# Patient Record
Sex: MALE | Race: BLACK OR AFRICAN AMERICAN | Employment: UNEMPLOYED | URBAN - METROPOLITAN AREA
[De-identification: names, ages, dates, MRNs, and addresses within clinical notes are randomized per-mention and may not be internally consistent; named-entity substitution may affect disease eponyms.]

---
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
## 2024-11-11 ENCOUNTER — HOSPITAL ENCOUNTER (INPATIENT)
Facility: HOSPITAL | Age: 19
LOS: 1 days | Discharge: OTHER INSTITUTION | DRG: 881 | End: 2024-11-13
Attending: STUDENT IN AN ORGANIZED HEALTH CARE EDUCATION/TRAINING PROGRAM | Admitting: PSYCHIATRY & NEUROLOGY
Payer: COMMERCIAL

## 2024-11-11 DIAGNOSIS — F48.9 MENTAL HEALTH PROBLEM: Primary | ICD-10-CM

## 2024-11-11 LAB
ALBUMIN SERPL-MCNC: 4.5 G/DL (ref 3.5–5)
ALBUMIN/GLOB SERPL: 1 (ref 1.1–2.2)
ALP SERPL-CCNC: 84 U/L (ref 45–117)
ALT SERPL-CCNC: 13 U/L (ref 12–78)
AMPHET UR QL SCN: NEGATIVE
ANION GAP SERPL CALC-SCNC: 12 MMOL/L (ref 2–12)
AST SERPL-CCNC: 13 U/L (ref 15–37)
BARBITURATES UR QL SCN: NEGATIVE
BASOPHILS # BLD: 0 K/UL (ref 0–0.1)
BASOPHILS NFR BLD: 0 % (ref 0–1)
BENZODIAZ UR QL: NEGATIVE
BILIRUB SERPL-MCNC: 0.5 MG/DL (ref 0.2–1)
BUN SERPL-MCNC: 12 MG/DL (ref 6–20)
BUN/CREAT SERPL: 11 (ref 12–20)
CALCIUM SERPL-MCNC: 10 MG/DL (ref 8.5–10.1)
CANNABINOIDS UR QL SCN: NEGATIVE
CHLORIDE SERPL-SCNC: 102 MMOL/L (ref 97–108)
CO2 SERPL-SCNC: 27 MMOL/L (ref 21–32)
COCAINE UR QL SCN: NEGATIVE
CREAT SERPL-MCNC: 1.13 MG/DL (ref 0.7–1.3)
DIFFERENTIAL METHOD BLD: NORMAL
EOSINOPHIL # BLD: 0 K/UL (ref 0–0.4)
EOSINOPHIL NFR BLD: 0 % (ref 0–7)
ERYTHROCYTE [DISTWIDTH] IN BLOOD BY AUTOMATED COUNT: 12 % (ref 11.5–14.5)
ETHANOL SERPL-MCNC: 30 MG/DL (ref 0–0.08)
GLOBULIN SER CALC-MCNC: 4.4 G/DL (ref 2–4)
GLUCOSE SERPL-MCNC: 103 MG/DL (ref 65–100)
HCT VFR BLD AUTO: 45.2 % (ref 36.6–50.3)
HGB BLD-MCNC: 15.4 G/DL (ref 12.1–17)
IMM GRANULOCYTES # BLD AUTO: 0 K/UL (ref 0–0.04)
IMM GRANULOCYTES NFR BLD AUTO: 0 % (ref 0–0.5)
LYMPHOCYTES # BLD: 1.8 K/UL (ref 0.8–3.5)
LYMPHOCYTES NFR BLD: 25 % (ref 12–49)
Lab: NORMAL
MCH RBC QN AUTO: 28.5 PG (ref 26–34)
MCHC RBC AUTO-ENTMCNC: 34.1 G/DL (ref 30–36.5)
MCV RBC AUTO: 83.5 FL (ref 80–99)
METHADONE UR QL: NEGATIVE
MONOCYTES # BLD: 0.4 K/UL (ref 0–1)
MONOCYTES NFR BLD: 5 % (ref 5–13)
NEUTS SEG # BLD: 4.9 K/UL (ref 1.8–8)
NEUTS SEG NFR BLD: 70 % (ref 32–75)
NRBC # BLD: 0 K/UL (ref 0–0.01)
NRBC BLD-RTO: 0 PER 100 WBC
OPIATES UR QL: NEGATIVE
PCP UR QL: NEGATIVE
PLATELET # BLD AUTO: 346 K/UL (ref 150–400)
PMV BLD AUTO: 9.4 FL (ref 8.9–12.9)
POTASSIUM SERPL-SCNC: 3.7 MMOL/L (ref 3.5–5.1)
PROT SERPL-MCNC: 8.9 G/DL (ref 6.4–8.2)
RBC # BLD AUTO: 5.41 M/UL (ref 4.1–5.7)
SODIUM SERPL-SCNC: 141 MMOL/L (ref 136–145)
WBC # BLD AUTO: 7.2 K/UL (ref 4.1–11.1)

## 2024-11-11 PROCEDURE — 36415 COLL VENOUS BLD VENIPUNCTURE: CPT

## 2024-11-11 PROCEDURE — 85025 COMPLETE CBC W/AUTO DIFF WBC: CPT

## 2024-11-11 PROCEDURE — 82077 ASSAY SPEC XCP UR&BREATH IA: CPT

## 2024-11-11 PROCEDURE — 80053 COMPREHEN METABOLIC PANEL: CPT

## 2024-11-11 PROCEDURE — 80307 DRUG TEST PRSMV CHEM ANLYZR: CPT

## 2024-11-11 PROCEDURE — 99285 EMERGENCY DEPT VISIT HI MDM: CPT

## 2024-11-11 ASSESSMENT — PAIN - FUNCTIONAL ASSESSMENT: PAIN_FUNCTIONAL_ASSESSMENT: NONE - DENIES PAIN

## 2024-11-12 PROBLEM — F43.21 ADJUSTMENT DISORDER WITH DEPRESSED MOOD: Status: ACTIVE | Noted: 2024-11-12

## 2024-11-12 PROBLEM — F60.3 BORDERLINE PERSONALITY DISORDER (HCC): Status: ACTIVE | Noted: 2024-11-12

## 2024-11-12 PROBLEM — F39 UNSPECIFIED MOOD (AFFECTIVE) DISORDER (HCC): Status: ACTIVE | Noted: 2024-11-12

## 2024-11-12 PROBLEM — F39 UNSPECIFIED MOOD (AFFECTIVE) DISORDER (HCC): Status: RESOLVED | Noted: 2024-11-12 | Resolved: 2024-11-12

## 2024-11-12 PROCEDURE — 99223 1ST HOSP IP/OBS HIGH 75: CPT | Performed by: PSYCHIATRY & NEUROLOGY

## 2024-11-12 PROCEDURE — 1240000000 HC EMOTIONAL WELLNESS R&B

## 2024-11-12 PROCEDURE — 6370000000 HC RX 637 (ALT 250 FOR IP)

## 2024-11-12 RX ORDER — ACETAMINOPHEN 325 MG/1
650 TABLET ORAL EVERY 4 HOURS PRN
Status: DISCONTINUED | OUTPATIENT
Start: 2024-11-12 | End: 2024-11-12 | Stop reason: SDUPTHER

## 2024-11-12 RX ORDER — MAGNESIUM HYDROXIDE/ALUMINUM HYDROXICE/SIMETHICONE 120; 1200; 1200 MG/30ML; MG/30ML; MG/30ML
30 SUSPENSION ORAL EVERY 6 HOURS PRN
Status: DISCONTINUED | OUTPATIENT
Start: 2024-11-12 | End: 2024-11-12

## 2024-11-12 RX ORDER — HALOPERIDOL 5 MG/ML
2.5 INJECTION INTRAMUSCULAR EVERY 4 HOURS PRN
Status: DISCONTINUED | OUTPATIENT
Start: 2024-11-12 | End: 2024-11-12

## 2024-11-12 RX ORDER — ACETAMINOPHEN 325 MG/1
650 TABLET ORAL EVERY 4 HOURS PRN
Status: DISCONTINUED | OUTPATIENT
Start: 2024-11-12 | End: 2024-11-13 | Stop reason: HOSPADM

## 2024-11-12 RX ORDER — DIPHENHYDRAMINE HYDROCHLORIDE 50 MG/ML
25 INJECTION INTRAMUSCULAR; INTRAVENOUS EVERY 4 HOURS PRN
Status: DISCONTINUED | OUTPATIENT
Start: 2024-11-12 | End: 2024-11-12

## 2024-11-12 RX ORDER — HALOPERIDOL 5 MG/1
2.5 TABLET ORAL EVERY 4 HOURS PRN
Status: DISCONTINUED | OUTPATIENT
Start: 2024-11-12 | End: 2024-11-12

## 2024-11-12 RX ORDER — TRAZODONE HYDROCHLORIDE 50 MG/1
50 TABLET, FILM COATED ORAL NIGHTLY PRN
Status: DISCONTINUED | OUTPATIENT
Start: 2024-11-12 | End: 2024-11-13 | Stop reason: HOSPADM

## 2024-11-12 RX ORDER — MAGNESIUM HYDROXIDE/ALUMINUM HYDROXICE/SIMETHICONE 120; 1200; 1200 MG/30ML; MG/30ML; MG/30ML
30 SUSPENSION ORAL EVERY 6 HOURS PRN
Status: DISCONTINUED | OUTPATIENT
Start: 2024-11-12 | End: 2024-11-13 | Stop reason: HOSPADM

## 2024-11-12 RX ORDER — HYDROXYZINE HYDROCHLORIDE 25 MG/1
50 TABLET, FILM COATED ORAL 3 TIMES DAILY PRN
Status: DISCONTINUED | OUTPATIENT
Start: 2024-11-12 | End: 2024-11-13 | Stop reason: HOSPADM

## 2024-11-12 RX ORDER — POLYETHYLENE GLYCOL 3350 17 G/17G
17 POWDER, FOR SOLUTION ORAL DAILY PRN
Status: DISCONTINUED | OUTPATIENT
Start: 2024-11-12 | End: 2024-11-12

## 2024-11-12 RX ORDER — HALOPERIDOL 5 MG/1
5 TABLET ORAL EVERY 4 HOURS PRN
Status: DISCONTINUED | OUTPATIENT
Start: 2024-11-12 | End: 2024-11-13 | Stop reason: HOSPADM

## 2024-11-12 RX ORDER — HALOPERIDOL 5 MG/ML
5 INJECTION INTRAMUSCULAR EVERY 4 HOURS PRN
Status: DISCONTINUED | OUTPATIENT
Start: 2024-11-12 | End: 2024-11-13 | Stop reason: HOSPADM

## 2024-11-12 RX ORDER — DIPHENHYDRAMINE HYDROCHLORIDE 50 MG/ML
50 INJECTION INTRAMUSCULAR; INTRAVENOUS EVERY 4 HOURS PRN
Status: DISCONTINUED | OUTPATIENT
Start: 2024-11-12 | End: 2024-11-13 | Stop reason: HOSPADM

## 2024-11-12 RX ORDER — POLYETHYLENE GLYCOL 3350 17 G/17G
17 POWDER, FOR SOLUTION ORAL DAILY PRN
Status: DISCONTINUED | OUTPATIENT
Start: 2024-11-12 | End: 2024-11-13 | Stop reason: HOSPADM

## 2024-11-12 RX ADMIN — HYDROXYZINE HYDROCHLORIDE 50 MG: 25 TABLET, FILM COATED ORAL at 03:17

## 2024-11-12 RX ADMIN — TRAZODONE HYDROCHLORIDE 50 MG: 50 TABLET ORAL at 03:18

## 2024-11-12 ASSESSMENT — LIFESTYLE VARIABLES
HOW OFTEN DO YOU HAVE A DRINK CONTAINING ALCOHOL: MONTHLY OR LESS
HOW MANY STANDARD DRINKS CONTAINING ALCOHOL DO YOU HAVE ON A TYPICAL DAY: 1 OR 2

## 2024-11-12 ASSESSMENT — SLEEP AND FATIGUE QUESTIONNAIRES
DO YOU USE A SLEEP AID: NO
AVERAGE NUMBER OF SLEEP HOURS: 6
DO YOU USE A SLEEP AID: NO
DO YOU HAVE DIFFICULTY SLEEPING: NO
DO YOU HAVE DIFFICULTY SLEEPING: NO

## 2024-11-12 ASSESSMENT — PAIN SCALES - GENERAL
PAINLEVEL_OUTOF10: 0

## 2024-11-12 NOTE — CARE COORDINATION
11/12/24 0901   ITP   Date of Plan 11/12/24   Primary Diagnosis Code unspecified mood disorder   Barriers to Treatment Psychiatric symptom (comment)  (depression)   Strengths Incorporated in Plan Support network;Family supports;Acknowledging need for assistance   Plan of Care   Long Term Goal (LTG) Stated in patient/guardian terms remain safe and stable in the community   Short Term Goal 1   Short Term Goal 1 Client will maintain compliance with medication regime   Baseline Functioning client has not been taking medication as prescribed and is experiencing symptoms of depression and suicidal ideation   Target client will take medication as prescribed and report a reduction in symptoms of depression and suicidal ideation   Objectives Client will participate in group therapy;Other (comment)   Intervention 1 Monitor medications   Frequency daily   Measured by Self report;Staff observation   Staff Responsible Clinical staff;[facility] staff   Intervention 2 Referral to community services   Frequency prior to discharge   Measured by Self report;Staff observation   Staff Responsible Clinical staff;[facility] staff   STG Goal 1 Status: Patient Appears to be  Progressing toward treatment plan goal  (acknolwedging need for assistance)   Short Term Goal 2   Short Term Goal 2 Client will learn and demonstrate effective coping skills   Baseline Functioning client is not able to use effective coping skills and requires hospitalization to keep themselves safe   Target client will learn and use effective coping skills to remain safe in the community   Objectives Client will participate in group therapy;Other (comment)   Intervention 1 Group therapy;Milieu therapy and support   Frequency on going   Measured by Self report;Staff observation   Staff Responsible Clinical staff;[facility] staff   Intervention 2 Referral to community services   Frequency prior to discharge   Measured by Staff observation;Self report   Staff Responsible Clinical staff;[facility]

## 2024-11-12 NOTE — GROUP NOTE
Group Therapy Note    Date: 11/12/2024    Group Start Time: 1400  Group End Time: 1500  Group Topic: Recreational    [facility]    Marilee Kaplan        Group Therapy Note    Attendees: 8       Patient's Goal:  To concentrate on selected task    Notes:  Pt did not attend session      Discipline Responsible: Recreational Therapist      Signature:  MARILEE KAPLAN

## 2024-11-12 NOTE — CARE COORDINATION
11/12/24 0900   Suicidal Ideation   Wish to be Dead Lifetime - Yes;Past 1 month - Yes   Non-Specific Active Suicidal Thoughts Lifetime - Yes;Past 1 month - Yes   Suicidal Behavior Trigger Wish to be Dead   Active Suicidal Ideation with Any Methods (Not Plan) without Intent to Act Lifetime - Yes;Past 1 month - Yes   Active Suicidal Ideation with Some Intent to Act, without Specific Plan Lifetime - Yes;Past 1 month - Yes   Active Suicidal Ideation with Specific Plan and Intent Lifetime - Yes;Past 1 month - No   Intensity of Ideation   Lifetime - Most Severe Ideation 4   Lifetime - Most Recent Ideation 2   Frequency Once a week   Duration Less than 1 hour/some of the time   Controllability Can control thoughts with some difficulty   Deterrents Deterrents probably stopped you   Reasons for Ideation Does not apply   Suicidal Behavior   Actual Attempt Lifetime - Yes;Past 3 months - No   Has subject engaged in Non-Suicidal Self-Injurious Behavior? Lifetime - Yes;Past 3 months - Yes   Interrupted Attempt Past 3 months - No;Lifetime - Yes   Aborted or Self-Interrupted Attempt Lifetime - No;Past 3 months - No   Preparatory Acts or Behavior Lifetime - Yes;Past 3 months - No   Actual Lethality/Medical Damage 2   Potential Lethality 2

## 2024-11-12 NOTE — H&P
stopped taking them in April.  Pt described his thoughts of self harm being intermittent at times but pretty frequent. Pt has been able to go to work but stated he doesn't have much motivation do other things. He reported sleeping \"ok\" but a decrease in appetite and some weight loss over the past few months.   Pt denies access to any firearms. Pt denies HI/AHVH. At this time admission is recommended.    During My encounter on 11/12/2024:     Patient was examined at bedside via telehealth block.  Patient denies any medical issues no chest pain or shortness of breath no fever no chills    The patient denies any headache, blurry vision, sore throat, trouble swallowing, trouble with speech, chest pain, SOB, cough, fever, chills, N/V/D, abd pain, urinary symptoms, constipation, recent travels, sick contacts, focal or generalized neurological symptoms, falls, injuries, rashes, contact with COVID-19 diagnosed patients, hematemesis, melena, hemoptysis, hematuria, rashes, denies starting any new medications and denies any other concerns or problems besides as mentioned above.         REVIEW OF SYSTEMS:  Pertinent items are noted in the History of Present Illness.     Past Medical History:   Diagnosis Date    ADHD     age 3    Anxiety     age 11    Depression     Hx of seasonal allergies       No past surgical history on file.  Prior to Admission medications    Not on File     Allergies   Allergen Reactions    Prunus Persica Itching and Swelling      Family History   Problem Relation Age of Onset    No Known Problems Brother     No Known Problems Brother     No Known Problems Sister     Hypertension Father     No Known Problems Mother     No Known Problems Brother       Social History:  reports that he has never smoked. He has never used smokeless tobacco. He reports that he does not currently use alcohol. He reports that he does not currently use drugs.   Social Determinants of Health     Tobacco Use: Low Risk  (6/24/2022) 
0.0  0.0 - 0.1 K/UL Final    Immature Granulocytes Absolute 11/11/2024 0.0  0.00 - 0.04 K/UL Final    Differential Type 11/11/2024 AUTOMATED    Final    Sodium 11/11/2024 141  136 - 145 mmol/L Final    Potassium 11/11/2024 3.7  3.5 - 5.1 mmol/L Final    Chloride 11/11/2024 102  97 - 108 mmol/L Final    CO2 11/11/2024 27  21 - 32 mmol/L Final    Anion Gap 11/11/2024 12  2 - 12 mmol/L Final    Glucose 11/11/2024 103 (H)  65 - 100 mg/dL Final    BUN 11/11/2024 12  6 - 20 MG/DL Final    Creatinine 11/11/2024 1.13  0.70 - 1.30 MG/DL Final    BUN/Creatinine Ratio 11/11/2024 11 (L)  12 - 20   Final    Est, Glom Filt Rate 11/11/2024 >90  >60 ml/min/1.73m2 Final    Calcium 11/11/2024 10.0  8.5 - 10.1 MG/DL Final    Total Bilirubin 11/11/2024 0.5  0.2 - 1.0 MG/DL Final    ALT 11/11/2024 13  12 - 78 U/L Final    AST 11/11/2024 13 (L)  15 - 37 U/L Final    Alk Phosphatase 11/11/2024 84  45 - 117 U/L Final    Total Protein 11/11/2024 8.9 (H)  6.4 - 8.2 g/dL Final    Albumin 11/11/2024 4.5  3.5 - 5.0 g/dL Final    Globulin 11/11/2024 4.4 (H)  2.0 - 4.0 g/dL Final    Albumin/Globulin Ratio 11/11/2024 1.0 (L)  1.1 - 2.2   Final          MEDICATIONS     SCHEDULED MEDICATIONS            ASSESSMENT & PLAN     The patient, Dioni Martinez, is a 19 y.o.  male who presents at this time for treatment of the following diagnoses:  Unspecified mood (affective) disorder (HCC)  ASSESSMENT: the patient presents with suicidal ideation in the setting of psychosocial stressors, some EtOH use. He had been previously diagnosed with borderline personality disorder, with a crisis that appears to be resolving with support. He would benefit from a mood stabilizing medication. Will treat symptomatically.  PLAN:  - Consider mood stabilizer for mood lability related to BPD  - IGM therapy as tolerated  - Expand database / obtain collateral  - Dispo planning (safety planning with family)       I will obtain labwork for all medications for which routine

## 2024-11-12 NOTE — PROGRESS NOTES
[facility] Admission Pharmacy Medication Reconciliation    Information obtained from: Patient interview  RxQuery data available1: No    Comments/recommendations:  Denies taking any prescription or OTC medications currently. Reports history of hydroxyzine and sertraline.     Medication changes (since last review):  Added  None  Removed  Hydroxyzine  Sertraline  Adjusted  None     1RxQuery pharmacy benefit data reflects medications filled and processed through the patient's insurance, however                this data does NOT capture whether the medication was picked up or is currently being taken by the patient.       Patient allergies:   Allergies as of 11/11/2024 - Fully Reviewed 11/11/2024   Allergen Reaction Noted    Prunus persica Itching and Swelling 04/08/2022       Prior to admission medications:   None          Thank you,  Leia Sevilla, PharmD, BCPS, BCPP  Clinical Pharmacy Specialist, Behavioral Health  Desk: [phone] ([phone])  Pharmacy: [phone] ([phone])

## 2024-11-12 NOTE — BSMART NOTE
Comprehensive Assessment Form Part 1      Section I - Disposition    Primary Diagnosis: Depression   Secondary Diagnosis:     The Medical Doctor to Psychiatrist conference was notcompleted.  The Medical Doctor is in agreement with Psychiatrist disposition because of (reason) pt meets criteria for admission.  The plan is voluntary impatient U admission .  The on-call Psychiatrist consulted was  .  The admitting Psychiatrist will be  .  The admitting Diagnosis is Depression .  The Payor source is no active insurance This writer reviewed the Cross Suicide Severity Rating Scale in nursing flowsheet and the risk level assigned is high risk.  Based on this assessment, the risk of suicide is high risk and the plan is voluntary admission .     Section II - Integrated Summary  Summary:  Pt presented to the ED after the pt's family was concerned that he was having thoughts of self harm and they contacted the police. Pt was seen via telehealth ar [facility] ED. Pt was sitting up on the bed. Pt reported that he overdose in April of this year. He stated that he took the medications he was prescribed Seroquel and Hydroxyzine , the OD did not result in an admission or ER visit. Pt's family did call poison control because the pt couldn't move his body. Pt did throw up and he reported the family did not take him to the ER. Pt was seen in 2022 at a family practice for a panic attack and he was prescribed medications/. In April of this year he went to [facility] and was sent to the [facility]. He can't recall the provider he saw but he was prescribed medications but he stopped taking them in April.  Pt described his thoughts of self harm being intermittent at times but pretty frequent. Pt has been able to go to work but stated he doesn't have much motivation do other things. He reported sleeping \"ok\" but a decrease in appetite and some weight loss over the past few months.   Pt denies access to any firearms. Pt denies

## 2024-11-12 NOTE — ED TRIAGE NOTES
Pt reports SI x today, sts his s.o family called the police to check on him because he had thoughts of self harm. Pt reports he's had thoughts before, last time was in April. Pt sts he was diagnosed w BPD and has not been receiving tx. Pt aox4 , reports no active plan. Agreeable to ensuring safety at this time.

## 2024-11-12 NOTE — BSMART NOTE
BSMART assessment completed, and suicide risk level noted to be high . Primary Nurse Mariya and Charge Nurse N/A  and Physician Arcelia notified. Concerns not observed.    Pt has a constant sitter outside the room.

## 2024-11-12 NOTE — ED PROVIDER NOTES
EMERGENCY DEPARTMENT HISTORY AND PHYSICAL EXAM      Date: 11/11/2024  Patient Name: Dioni Martinez    History of Presenting Illness     Chief Complaint   Patient presents with    Suicidal         HPI: History From: patient, History limited by: none  Dioni Martinez, 19 y.o. male presents to the ED with cc of suicidal ideation.  This has been going on for the past 2 days, he reports thoughts of not wanting to be around, however denies specific plan.  He denies homicidal ideation or hallucinations.  Has been undergoing some life stress recently.  States that his girlfriend's mom was concerned about his mental health.  He otherwise feels well, no headaches, fevers, cough or vomiting.  He reports a past medical history of mental health disorder, otherwise denies medical problems.  Reports that he was previously on sertraline and hydroxyzine, however is not currently taking anything.          There are no other complaints, changes, or physical findings at this time.    PCP: Minh Kemp MD    No current facility-administered medications on file prior to encounter.     Current Outpatient Medications on File Prior to Encounter   Medication Sig Dispense Refill    hydrOXYzine HCl (ATARAX) 50 MG tablet Take 1 tablet by mouth every 6 hours as needed      sertraline (ZOLOFT) 100 MG tablet Take 1 tablet by mouth daily         Past History     Past Medical History:  Past Medical History:   Diagnosis Date    ADHD     age 3    Anxiety     age 11    Depression     Hx of seasonal allergies        Past Surgical History:  No past surgical history on file.    Family History:  Family History   Problem Relation Age of Onset    No Known Problems Brother     No Known Problems Brother     No Known Problems Sister     Hypertension Father     No Known Problems Mother     No Known Problems Brother        Social History:  Social History     Tobacco Use    Smoking status: Never    Smokeless tobacco: Never   Substance Use Topics    Alcohol

## 2024-11-13 VITALS
BODY MASS INDEX: 18.03 KG/M2 | OXYGEN SATURATION: 98 % | RESPIRATION RATE: 16 BRPM | SYSTOLIC BLOOD PRESSURE: 118 MMHG | WEIGHT: 108.2 LBS | HEIGHT: 65 IN | HEART RATE: 97 BPM | DIASTOLIC BLOOD PRESSURE: 66 MMHG | TEMPERATURE: 97.7 F

## 2024-11-13 PROCEDURE — 6370000000 HC RX 637 (ALT 250 FOR IP): Performed by: PSYCHIATRY & NEUROLOGY

## 2024-11-13 PROCEDURE — 99239 HOSP IP/OBS DSCHRG MGMT >30: CPT | Performed by: PSYCHIATRY & NEUROLOGY

## 2024-11-13 RX ORDER — TRAZODONE HYDROCHLORIDE 50 MG/1
50 TABLET, FILM COATED ORAL NIGHTLY PRN
Qty: 30 TABLET | Refills: 1 | Status: SHIPPED | OUTPATIENT
Start: 2024-11-13

## 2024-11-13 RX ORDER — LAMOTRIGINE 100 MG/1
TABLET ORAL
Qty: 42 TABLET | Refills: 0 | Status: SHIPPED | OUTPATIENT
Start: 2024-12-13

## 2024-11-13 RX ORDER — LAMOTRIGINE 25 MG/1
TABLET ORAL
Qty: 42 TABLET | Refills: 0 | Status: SHIPPED | OUTPATIENT
Start: 2024-11-13

## 2024-11-13 RX ORDER — LAMOTRIGINE 25 MG/1
25 TABLET ORAL DAILY
Status: DISCONTINUED | OUTPATIENT
Start: 2024-11-13 | End: 2024-11-13 | Stop reason: HOSPADM

## 2024-11-13 RX ORDER — HYDROXYZINE HYDROCHLORIDE 50 MG/1
50 TABLET, FILM COATED ORAL 2 TIMES DAILY PRN
Qty: 60 TABLET | Refills: 1 | Status: SHIPPED | OUTPATIENT
Start: 2024-11-13 | End: 2025-01-12

## 2024-11-13 RX ADMIN — LAMOTRIGINE 25 MG: 25 TABLET ORAL at 12:04

## 2024-11-13 ASSESSMENT — PAIN SCALES - GENERAL: PAINLEVEL_OUTOF10: 0

## 2024-11-13 NOTE — PLAN OF CARE
Problem: Depression  Goal: Will be euthymic at discharge  Description: INTERVENTIONS:  1. Administer medication as ordered  2. Provide emotional support via 1:1 interaction with staff  3. Encourage involvement in milieu/groups/activities  4. Monitor for social isolation  11/12/2024 0358 by Beto Mccormick RN  Outcome: Not Progressing     Problem: Anxiety  Goal: Will report anxiety at manageable levels  Description: INTERVENTIONS:  1. Administer medication as ordered  2. Teach and rehearse alternative coping skills  3. Provide emotional support with 1:1 interaction with staff  11/12/2024 0358 by Beto Mccormick RN  Outcome: Not Progressing     Problem: Self Harm/Suicidality  Goal: Will have no self-injury during hospital stay  Description: INTERVENTIONS:  1.  Ensure constant observer at bedside with Q15M safety checks  2.  Maintain a safe environment  3.  Secure patient belongings  4.  Ensure family/visitors adhere to safety recommendations  5.  Ensure safety tray has been added to patient's diet order  6.  Every shift and PRN: Re-assess suicidal risk via Frequent Screener    11/12/2024 1230 by Mary Jensen RN  Outcome: Progressing  11/12/2024 0358 by Beto Mccormick RN  Outcome: Progressing     Problem: Safety - Adult  Goal: Free from fall injury  11/12/2024 1230 by Mary Jensen RN  Outcome: Progressing  11/12/2024 0358 by Beto Mccormick RN  Outcome: Progressing     Problem: Psychosis  Goal: Will report no hallucinations or delusions  Description: INTERVENTIONS:  1. Administer medication as  ordered  2. Assist with reality testing to support increasing orientation  3. Assess if patient's hallucinations or delusions are encouraging self harm or harm to others and intervene as appropriate  11/12/2024 0358 by Beto Mccormick RN  Outcome: Progressing     Problem: Behavior  Goal: Pt/Family maintain appropriate behavior and adhere to behavioral management agreement, if implemented  Description: INTERVENTIONS:  1. Assess 
  Problem: Discharge Planning  Goal: Discharge to home or other facility with appropriate resources  11/13/2024 1503 by Jennie Olea RN  Outcome: Adequate for Discharge  11/13/2024 0914 by Jennie Olea RN  Outcome: Progressing     Problem: Self Harm/Suicidality  Goal: Will have no self-injury during hospital stay  Description: INTERVENTIONS:  1.  Ensure constant observer at bedside with Q15M safety checks  2.  Maintain a safe environment  3.  Secure patient belongings  4.  Ensure family/visitors adhere to safety recommendations  5.  Ensure safety tray has been added to patient's diet order  6.  Every shift and PRN: Re-assess suicidal risk via Frequent Screener    11/13/2024 1503 by Jennie Olea RN  Outcome: Adequate for Discharge  11/13/2024 0914 by Jennie Olea RN  Outcome: Progressing     Problem: Safety - Adult  Goal: Free from fall injury  11/13/2024 1503 by Jennie Olea RN  Outcome: Adequate for Discharge  11/13/2024 0914 by Jennie Olea RN  Outcome: Progressing     Problem: Depression  Goal: Will be euthymic at discharge  Description: INTERVENTIONS:  1. Administer medication as ordered  2. Provide emotional support via 1:1 interaction with staff  3. Encourage involvement in milieu/groups/activities  4. Monitor for social isolation  11/13/2024 1503 by Jennie Olea RN  Outcome: Adequate for Discharge  11/13/2024 0914 by Jennie Olea RN  Outcome: Progressing     Problem: Psychosis  Goal: Will report no hallucinations or delusions  Description: INTERVENTIONS:  1. Administer medication as  ordered  2. Assist with reality testing to support increasing orientation  3. Assess if patient's hallucinations or delusions are encouraging self harm or harm to others and intervene as appropriate  11/13/2024 1503 by Jennie Olea RN  Outcome: Adequate for Discharge  11/13/2024 0914 by Jennie Olea RN  Outcome: Progressing     Problem: Behavior  Goal: Pt/Family maintain 
  Problem: Discharge Planning  Goal: Discharge to home or other facility with appropriate resources  Outcome: Progressing     Problem: Self Harm/Suicidality  Goal: Will have no self-injury during hospital stay  Description: INTERVENTIONS:  1.  Ensure constant observer at bedside with Q15M safety checks  2.  Maintain a safe environment  3.  Secure patient belongings  4.  Ensure family/visitors adhere to safety recommendations  5.  Ensure safety tray has been added to patient's diet order  6.  Every shift and PRN: Re-assess suicidal risk via Frequent Screener    11/13/2024 0914 by Jennie Olea RN  Outcome: Progressing  11/12/2024 2200 by Yamilex Sorenson RN  Outcome: Progressing     Problem: Safety - Adult  Goal: Free from fall injury  Outcome: Progressing     Problem: Depression  Goal: Will be euthymic at discharge  Description: INTERVENTIONS:  1. Administer medication as ordered  2. Provide emotional support via 1:1 interaction with staff  3. Encourage involvement in milieu/groups/activities  4. Monitor for social isolation  11/13/2024 0914 by Jennie Olea RN  Outcome: Progressing  11/12/2024 2200 by Yamilex Sorenson RN  Outcome: Progressing     Problem: Psychosis  Goal: Will report no hallucinations or delusions  Description: INTERVENTIONS:  1. Administer medication as  ordered  2. Assist with reality testing to support increasing orientation  3. Assess if patient's hallucinations or delusions are encouraging self harm or harm to others and intervene as appropriate  Outcome: Progressing     
  Problem: Self Harm/Suicidality  Goal: Will have no self-injury during hospital stay  Description: INTERVENTIONS:  1.  Ensure constant observer at bedside with Q15M safety checks  2.  Maintain a safe environment  3.  Secure patient belongings  4.  Ensure family/visitors adhere to safety recommendations  5.  Ensure safety tray has been added to patient's diet order  6.  Every shift and PRN: Re-assess suicidal risk via Frequent Screener    11/12/2024 2200 by Yamilex Sorenson RN  Outcome: Progressing  11/12/2024 1230 by Mary Jensen RN  Outcome: Progressing     Problem: Safety - Adult  Goal: Free from fall injury  11/12/2024 1230 by Mary Jensen RN  Outcome: Progressing     Problem: Depression  Goal: Will be euthymic at discharge  Description: INTERVENTIONS:  1. Administer medication as ordered  2. Provide emotional support via 1:1 interaction with staff  3. Encourage involvement in milieu/groups/activities  4. Monitor for social isolation  Outcome: Progressing     
refer to organization policy.  7. Initiate consult with , Psychosocial CNS, Spiritual Care as appropriate  Outcome: Progressing     Problem: Anxiety  Goal: Will report anxiety at manageable levels  Description: INTERVENTIONS:  1. Administer medication as ordered  2. Teach and rehearse alternative coping skills  3. Provide emotional support with 1:1 interaction with staff  Outcome: Not Progressing

## 2024-11-13 NOTE — DISCHARGE SUMMARY
PSYCHIATRIC DISCHARGE SUMMARY         IDENTIFICATION:    Patient Name  Dioni Martinez   Date of Birth 2005   [facility] [id number]   Medical Record Number  [id number]      Age  19 y.o.   PCP Minh Kemp MD   Admit date:  11/11/2024    Discharge date: 11/13/2024   Room Number  313/01  @ [facility]   Date of Service  11/13/2024            TYPE OF DISCHARGE: REGULAR               CONDITION AT DISCHARGE: Fair       PROVISIONAL & DISCHARGE DIAGNOSES:        Active Hospital Problems    Adjustment disorder with depressed mood      *Borderline personality disorder (HCC)        DISCHARGE DIAGNOSIS:   Axis I:  SEE ABOVE  Axis II: SEE ABOVE  Axis III: SEE ABOVE  Axis IV:  lack of structure  Axis V:  30 on admission, 55 on discharge     HISTORY OF PRESENT ILLNESS:  \"Suicidal ideation\"    The patient, Dioni Martinez, is a 19 y.o.  Black /  male with a past psychiatric history significant for borderline personality vs major depressive disorder by chart review, who presents at this time with complaints of (and/or evidence of) the following emotional symptoms: depression and suicidal thoughts/threats.  Additional symptomatology include mood lability.  The above symptoms have been present for 2+ weeks. These symptoms are of moderate to high severity. These symptoms are constant in nature.  The patient's condition has been precipitated by psychosocial stressors.  Patient's condition made worse by alcohol use as well as treatment noncompliance. UDS: negative; BAL=30.     The patient presented to the ED with c/o SI, stating that his girlfriend's mother was concerned for him after he endorsed thoughts of wanting to die. He has a history of SA by overdose in the past but was never hospitalized previously. He states that he was sad for several different reasons, but nothing in particular, and relayed this to his girlfriend's mother who then recommended hospitalization.      The patient is a fair

## 2024-11-13 NOTE — PROGRESS NOTES
Pt screened per LOS policy.  No acute nutrition or weight issues but pt screens for low wt/BMI.  Wt has been stable per EMR.  Extra food and supplements ordered.  Hx remarkable for low wt/BMI.  Ht: 5'5\"  Wt: 108 lb .32 oz  BMI: 18.01 kg/(m^2) c/w underweight  Est energy needs: 2000 kcal, 72 g protein, 1800 mL fluids  Pt will consume > 75% of meals at follow up 7-10 days  LOS, BMI

## 2024-11-13 NOTE — PROGRESS NOTES
Laboratory Monitoring for Mood Stabilizers and Antipsychotics    Recommended baseline monitoring has not been completed based on this patient's current medication regimen.     This patient is currently prescribed the following medication(s):   Current Facility-Administered Medications: lamoTRIgine (LAMICTAL) tablet 25 mg, 25 mg, Oral, Daily  acetaminophen (TYLENOL) tablet 650 mg, 650 mg, Oral, Q4H PRN  polyethylene glycol (GLYCOLAX) packet 17 g, 17 g, Oral, Daily PRN  aluminum & magnesium hydroxide-simethicone (MAALOX) 200-200-20 MG/5ML suspension 30 mL, 30 mL, Oral, Q6H PRN  hydrOXYzine HCl (ATARAX) tablet 50 mg, 50 mg, Oral, TID PRN  haloperidol (HALDOL) tablet 5 mg, 5 mg, Oral, Q4H PRN **OR** haloperidol lactate (HALDOL) injection 5 mg, 5 mg, IntraMUSCular, Q4H PRN  diphenhydrAMINE (BENADRYL) injection 50 mg, 50 mg, IntraMUSCular, Q4H PRN  traZODone (DESYREL) tablet 50 mg, 50 mg, Oral, Nightly PRN      The following labs have been completed for monitoring of antipsychotics and/or mood stabilizers:    Height, Weight, BMI Estimation  Estimated body mass index is 18.01 kg/m² as calculated from the following:    Height as of this encounter: 1.651 m (5' 5\").    Weight as of this encounter: 49.1 kg (108 lb 3.2 oz).     Vital Signs/Blood Pressure  /66   Pulse 97   Temp 97.7 °F (36.5 °C) (Oral)   Resp 16   Ht 1.651 m (5' 5\")   Wt 49.1 kg (108 lb 3.2 oz)   SpO2 98%   BMI 18.01 kg/m²     Renal Function, Hepatic Function and Chemistry  Estimated Creatinine Clearance: 72 mL/min (based on SCr of 1.13 mg/dL).    Lab Results   Component Value Date/Time     11/11/2024 08:45 PM    K 3.7 11/11/2024 08:45 PM     11/11/2024 08:45 PM    CO2 27 11/11/2024 08:45 PM    ANIONGAP 12 11/11/2024 08:45 PM    GLUCOSE 103 11/11/2024 08:45 PM    BUN 12 11/11/2024 08:45 PM    CREATININE 1.13 11/11/2024 08:45 PM    BILITOT 0.5 11/11/2024 08:45 PM    GLOB 4.4 11/11/2024 08:45 PM    ALT 13 11/11/2024 08:45 PM    AST 13

## 2024-11-13 NOTE — DISCHARGE INSTRUCTIONS
Patient came into the office to drop off completed SIBO test; sent test to CV office via    contact any of the following toll-free 24-hour crisis hotlines for suicide prevention and support. Your call is free and confidential.   National Suicide Prevention Lifeline at 988, www.suicidepreventionlifeline.org   The National Hopeline Network at 9-954-321-HOPE (8876)   You may also contact [employer] at www.ip.access.org to access a live online network of volunteers trained and certified in crisis intervention or text HOME to 738092 to be connected with a live, trained crisis counselor through Crisis Text Line who will help you move from a hot moment to a cool moment.      EMERGENCY:   If you are experiencing an emergency, please call 911 or go to the nearest emergency room. If you are experiencing a mental health crisis, please call [facility] 24/7 crisis services at [phone].     MEDICAID-FUNDED CRISIS STABILIZATION AND MENTAL HEALTH SKILL BUILDING: Crisis stabilization services provide short term intensive mental health care to individuals who are experiencing an acute psychiatric crisis. The goal is to address and stabilize mental health needs as early as possible. Mental health skill building services are provided for individuals 18 years and older who suffer from serious mental illness, such as bipolar disorder, depression, substance abuse issues, and other psychotic disorders. Please consider contacting the following agencies to learn more about and self-refer for crisis stabilization and/or mental health skill-building services:      [employer] (p: [phone] f: [phone])   [facility] (p: [phone] f: [phone])   [facility] (p: [phone] f: [phone])   [facility] (p: [phone] f: [phone])   [facility] (p: [phone] f: [phone])   [facility] (p: [phone] f: [phone])   [facility] (p: [phone] f: [phone])   [facility] (p: [phone] f: [phone])

## 2024-11-13 NOTE — BH NOTE
1530 patient discharged home as ordered. Instructions provided and verbalized understanding. Prescription given along with belongings and valuable. Accompanied by staff down to the main lobby. Pt was picked up by his brother.   
Admission assessment complete. Patient arrived to the unit at 0139 by . Patient arrives alert and oriented x 4, skin warm and dry, respirations even and unlabored, and patient is on room air. VS are stable. He is calm and cooperative. The patient states that he was brought to the hospital by EMS for a psychiatric evaluation after his girlfriend reported that he was experiencing SI. Eye contact is noted to be good. Mood is noted to be anxious, depressed, and sad. Affect is appropriate. Patient currently rates depression 7/10, anxiety 10/10, endorses SI with no plan, but denies HI and AVH. C-SSRS level is High risk. Pt given hydroxyzine for anxiety, and Trazodone for sleep. There are no untoward side effects from medications to note.   Patient reports that he has set a goal to accomplish \"getting back to work\".    Patient has been oriented to the unit, admission packet discussed. Patient educated on reporting changes in his condition to nursing staff. The patient remains on 1:1 observation due to scoring High risk on C-SSRS. Provider Belle Isaac notified of score and continued the order for suicide precautions and constant observation. Standard BHU orders placed as well.      Skin check performed with Estela ORTEZ, and is within normal limits.      Hourly rounds are being completed to assure patient safety and attend to care needs. Monitoring will continue for changes in patient condition      SLEEP HOURS-3    
Behavioral Health Transition Record    Patient Name: Dioni Martinez  YOB: 2005   Medical Record Number: [id number]  Date of Admission: 11/11/2024  8:09 PM   Date of Discharge: 11/14/24      Attending Provider: No att. providers found   Discharging Provider: No att. providers found    To contact this individual call [phone] and ask the  to page.  If unavailable, ask to be transferred to Behavioral Health Provider on call.  A Behavioral Health Provider will be available on call 24/7 and during holidays.    Primary Care Provider: Minh Kemp MD    Allergies   Allergen Reactions    Prunus Persica Itching and Swelling       Reason for Admission: The patient, Dioni Martinez, is a 19 y.o.  Black /  male with a past psychiatric history significant for borderline personality vs major depressive disorder by chart review, who presents at this time with complaints of (and/or evidence of) the following emotional symptoms: depression and suicidal thoughts/threats.  Additional symptomatology include mood lability.  The above symptoms have been present for 2+ weeks. These symptoms are of moderate to high severity. These symptoms are constant in nature.  The patient's condition has been precipitated by psychosocial stressors.  Patient's condition made worse by alcohol use as well as treatment noncompliance. UDS: negative; BAL=30.     The patient presented to the ED with c/o SI, stating that his girlfriend's mother was concerned for him after he endorsed thoughts of wanting to die. He has a history of SA by overdose in the past but was never hospitalized previously. He states that he was sad for several different reasons, but nothing in particular, and relayed this to his girlfriend's mother who then recommended hospitalization.      The patient is a fair historian. The patient corroborates the above narrative. The patient contracts for safety on the unit and gives consent for the 
Pt received laying in bed quietly. A&Ox4. Affect flat, eye contact fair. No acute distress noted. Denies SI/HI/AVH at this time. Endorses anxiety rated 8/10. Pt states he is here because his girlfriend's mother was worried about him and called police. Pt is guarded and minimizes symptoms during assessment. Pt denies plan or intent to harm himself at this time and agrees to come to staff with safety concerns going forward. Remains on 1:1 constant observation d/t high CSSRS screening score.    1300: Pt requested to leave hospital today against medical advice. Treatment team made aware, attending provider requested crisis assessment by [facility] d/t safety concerns. [facility] informed and pt information faxed to them. Per attending physician, 1:1 observation was discontinued. Q15 minute rounds maintained for safety.     1500: Pt assessed in person by [facility] . Pt agreed to stay in the hospital voluntarily until tomorrow to continue treatment.       
Shift Assessment Note [id number]    Patient Alert and Oriented X4, met by RN in patient room. Patient calm and cooperative with assessment. Denies SI. Denies HI. Denies hallucinations. Patient observed sleeping in bed, and is isolative to bed this shift. Patient displays Constricted affect with Fair eye contact, and Anxious, Depressed mood. Thought process is Unremarkable. Patient Endorses anxiety 8/10 with Generalized symptoms. Patient Denies depression and demonstrates Isolative, Change in energy level. Patient received no PRNs this shift. this shift. CSSR score of No Risk this shift. Patient compliant with scheduled medication. Patient slept 10 hours overnight. No acute distress noted. No unsafe behaviors observed. Q15 minute observations maintained.    
housing/residential stability, and Interpersonal/supportive relationships (family, friends, peers)  Active Insurance as of 11/11/2024       Primary Coverage       Payor Plan Insurance Group Employer/Plan Group    [facility] [id number]       Payor Plan Address Payor Plan Phone Number Payor Plan Fax Number Effective Dates    [address] [address]   9/1/2024 - None Entered    [facility] [id number]         Subscriber Name Subscriber Birth Date Member ID       KRISTEN MOYA 2005 [id number]                     Collateral information: MomMelba- [phone]   Writer Ginger Conway ([facility] crisis clinician) spoke to pt mom, she reports she is concerned because pt appeared to be doing fine, and she was unaware he had made suicidal threats to his girlfriend until Steven SIDNEY arrived at her home to do a welfare check. She reports pt has a hx of getting into romantic relationships that cause him trouble. She reports that after his attempted OD pt was connected with [facility] and feels this was beneficial and would like pt to have this service again. She reports there is one fire arm in the house but it is locked and he does not have any access. She reports she will be locking all medication in her closet. Discussed that MD recommended starting mood stabilizer to reduce pt acting impulsivity on suicidal threats, she reports this sounds like a good idea.       Current psychiatric /substance abuse providers and contact info: not currently     Previous psychiatric/substance abuse providers and response to treatment: no previous psych admissions, was previously seeing someone at the [facility]     Family history of mental illness or substance abuse: pt reports he was told his mom was dx with something but he is unsure what.     Substance abuse history:  The patient currently is not using substances.     Social History     Tobacco Use    Smoking status: Unknown    Smokeless tobacco: Not on